# Patient Record
Sex: FEMALE | Race: WHITE
[De-identification: names, ages, dates, MRNs, and addresses within clinical notes are randomized per-mention and may not be internally consistent; named-entity substitution may affect disease eponyms.]

---

## 2018-02-10 ENCOUNTER — HOSPITAL ENCOUNTER (EMERGENCY)
Dept: HOSPITAL 24 - ER | Age: 48
LOS: 1 days | Discharge: HOME | End: 2018-02-11
Payer: COMMERCIAL

## 2018-02-10 VITALS — DIASTOLIC BLOOD PRESSURE: 58 MMHG | SYSTOLIC BLOOD PRESSURE: 108 MMHG

## 2018-02-10 VITALS — BODY MASS INDEX: 54.3 KG/M2

## 2018-02-10 DIAGNOSIS — M25.552: ICD-10-CM

## 2018-02-10 DIAGNOSIS — M48.061: Primary | ICD-10-CM

## 2018-02-10 DIAGNOSIS — M51.36: ICD-10-CM

## 2018-02-10 DIAGNOSIS — M51.06: ICD-10-CM

## 2018-02-10 DIAGNOSIS — E66.01: ICD-10-CM

## 2018-02-10 PROCEDURE — 99282 EMERGENCY DEPT VISIT SF MDM: CPT

## 2018-02-10 PROCEDURE — 73501 X-RAY EXAM HIP UNI 1 VIEW: CPT

## 2018-02-10 PROCEDURE — 99284 EMERGENCY DEPT VISIT MOD MDM: CPT

## 2018-02-10 PROCEDURE — 81001 URINALYSIS AUTO W/SCOPE: CPT

## 2018-02-10 PROCEDURE — 96372 THER/PROPH/DIAG INJ SC/IM: CPT

## 2018-02-10 PROCEDURE — 72131 CT LUMBAR SPINE W/O DYE: CPT

## 2018-02-10 NOTE — DR.GENAD
HPI





- PCP


Primary Care Physician: DR. LYNDSEY MATOS





- Complaint/Symptoms


Chief Complaint Doctors Comments: Patient is complaining of left hip and lower 

back pain for the past 24 hours getting worst tonight with her having problems 

with her balance. States she took a pain pill earlier today but it did not 

relieve the pain completely.  She denies dysuria, hematuri, fever or chills.  

States the pain starts in her lower back on the left side and goes down her 

left leg to her privates.  states the pain is 10 of 10.  States she has been 

taking care of her spouse with a broke hip but she has not been doing any heavy 

lifting.  States she has been loosing weight recently and went from 335 to 317. 

She denies any recent trauma.


Chief Complaint:: LEFT HIP PAIN THAT RADIATES TO CENTER OF BACK AND GOES DOWN; 

NUMBNESS


Self Treatment fo Chief Complaint: NO TX





- Nurses notes reviewed


Nurses Notes Review: Yes





- Source


History Provided: Patient





- Mode of Arrival


Mode of Arrival: Wheelchair





- Timing


Onset of Chief Complaint: 02/10/18


Came on: Gradually





- Duration


Duration: Constant


How long: 3


Duration: Days





- Location


Location: let hip and lower back pain





- Severity


Severity: Moderate





- Modifying Factors


Worsens:: walking and movement


Improves:: nothing





PMH





- PMH


Past Medical History: Yes


Past Medical History: Diabetes, Dyslipidemia, GERD, Hypertension


Past Medical History Comment: RLS


Past Surgical History: Yes


Surgical History: Tonsillectomy


Past Surgical History Comment: RIGHT EAR SURGERY; CATARACT;





- Family History


History of Family Medical Conditions: No





- Social History


Alcohol Use: None


Do you use any recreational Drugs:: No


Lives With: Spouse


Lives Where: Home





- infectious screening


In the last 2 months have you had wt loss of >10#?: NO


Have you had fever, night sweats or hemotysis?: No


Have you traveled outside the country in the last 6 months?: No


Isolation: Standard





ROS





- Review of Systems


Constitutional: No Symptoms Reported.  negative: See HPI, Chills, Diaphoresis, 

Fever, Malaise, Weakness, Irritable, Fatigue, Loss of Appetite, Other


Eyes: No Symptoms Reported.  negative: See HPI, Eye Pain, Blurred Vision, 

Tearing, Discharge, Photophobia, Diplopia, Other


ENTM: No Symptoms Reported, Nose Discharge, Nose Congestion


Respiratoy: No Symptoms Reported.  negative: See HPI, Productive Cough, Non-

Productive Cough, Moist Cough, Dry Cough, Hacking Cough, Barking Cough, Brassy 

Cough, Orthopnea, Short of Breath, Stridor, Wheezing, Hemoptysis, Other


Cardiovascular: No Symptoms Reported


Gastrointestinal/Abdominal: No Symptoms Reported.  negative: See HPI, Abdominal 

Pain, Constipation, Diarrhea, Nausea, Vomiting, Food Intolerance, Other


Genitourinary: No Symptoms Reported.  negative: See HPI, Discharge, Dysuria, 

Frequency, Hematuria, Pain, Bleeding, Other


Neurological: No Symptoms Reported, Paresthesia (left leg), Problems Walking (

left hip and back pain)


Musculoskeletal: No Symptoms Reported, Left, Back, Hip


Integumentary: No Symptoms Reported.  negative: See HPI, Change in Color, 

Change in Hair/Nails, Dryness, Lesions, Lumps, Rash, Itching, Wound, Bruises, 

Juandice, Other


Hematologic/Lymphatic: No Symptoms Reported.  negative: See HPI, Anemia, Blood 

Clots, Easy Bleeding, Easy Bruising, Swollen Glands, Lymphadenopathy, Other


Endocrine: No Symptoms Reported


Psychiatric: No Symptoms Reported.  negative: See HPI, Anxiety, Depression, 

Hallucinations, Excessive crying, Suicidal, Other





PE





- Vital Signs


Vitals: 


 





Temperature                      97.8 F


Pulse Rate                       65


Respiratory Rate                 20


Blood Pressure                   108/58


O2 Sat by Pulse Oximetry         100











- General


Limitations: No Limitations


General Appearance: Alert, In Distress (moderate)





- Head


Head Exam: Normal Inspection, Atraumatic, Normocephalic





- Eyes


Eye exam: Normal Appearance, PERRL, EOMI.  negative: Scleral Icterus, 

Conjunctival Injection, Nystagmus, Miosis, Mydrasis, Periorbital Swelling, 

Periorbital Tenderness, Other





- ENT


ENT Exam: Normal Exam, Normal Oropharynx, Normal  External Ear Exam, Mucous 

Membranes Moist, TM's Normal Bilaterally


External Ear Exam: Normal External Inspection


TM/Canal Exam: Bilateral Normal


Nose Exam: Normal Nose Exam


Mouth Exam: Normal Inspection.  negative: Drooling, Trismus, Lip Swelling, 

Tongue Elevation, Tongue Swelling, Laceration, Other


Throat Exam: Normal Inspection





- Neck


Neck Exam: Normal Inspection, Full ROM, Trachea Midline.  negative: Tenderness, 

Meningismus, Lymphadenopathy, Thyromegaly, Other





- Chest


Chest Inspection: Normal Inspection, Symmetric Chest Wall Rise





- Respiratory


Respiratory Exam: Normal Lung Sounds Bilat


Respiratory Exam: Bilateral Clear to Auscultation





- Cardiovascular


Cardiovascular Exam: Regular Rate, Normal Rhythm, Normal Heart Sounds





- Abdominal Exam


Abdominal Exam: Normal Inspection, Normal Bowel Sounds, Soft


Abdominal Tenderness: negative: RUQ, RLQ, LUQ, LLQ, Epigastrium, Suprapubic, 

Diffuse, Mild, Moderate, Severe, Other





- Extremities


Extremities Exam: Normal Inspection, Full ROM, Tenderness (left hi tender on 

palpation; tender on palpation lower back L2-4), Normal Capillary Refill.  

negative: Edema, Joint Swelling, Calf Tenderness





- Back


Back Exam: Normal Inspection, Full ROM, Tenderness, Paraspinal Tenderness, 

Vertebral Tenderness (L2-L4)





- Neurologic


Neurological Exam: Alert, Oriented X3, CN II-XII Intact, Normal Gait, Reflexes 

Normal





- Psychiatric


Psychiatric Exam: Normal Affect, Normal Mood





- Skin


Skin Exam: Warm, Dry, Intact, Normal Color





ROR





- Labs Reviewed


Laboratory Results Reviewed?: Yes (all x-ray results reviewed and discussed 

with patient and mother)





- XRAY


XRAY Interpreted by: Radiologist (CT lumbar: Multilevel discogenic degenerative 

change and facet arthropathy causing varying degrees of spinal canal and bony 

neural foraminal stenosis)





- Diagnosis


Discharge Problem: 


 Spinal stenosis at L4-L5 level, Bulging lumbar disc, Bulging of intervertebral 

disc between L4 and L5, Degenerative disc disease, lumbar, Left hip pain, 

Morbid obesity








- Discharge Plan


Disposition: 01 HOME, SELF-CARE


Condition: Stable


Prescriptions: 


Ketorolac Tromethamine [Toradol Tab] 10 mg PO Q8H PRN #12 tab


 PRN Reason: Pain


Methylprednisolone Dosepak 4Mg [MEDROL DOSEPAK (4 mg tab x 21)] 1 yesenia PO ONCE #

1 yesenia





- Follow ups/Referrals


Follow ups/Referrals: 


[facility],None [Primary Care Provider] - 3 days


SALENA WEST [STAFF PHYSICIAN] - 3 days





- Instructions


Instructions:  Spinal Stenosis, Degenerative Disk Disease, Back Pain, Adult, 

Easy-to-Read

## 2018-02-11 LAB
BACTERIA URNS QL MICRO: NEGATIVE /HPF
BILIRUB UR QL STRIP.AUTO: (no result)
CLARITY UR: CLEAR
COLOR UR AUTO: YELLOW
GLUCOSE UR QL STRIP.AUTO: NEGATIVE
KETONES UR QL STRIP.AUTO: NEGATIVE
LEUKOCYTE ESTERASE UR QL STRIP.AUTO: NEGATIVE
NITRITE UR QL STRIP.AUTO: NEGATIVE
PH UR STRIP.AUTO: 5 [PH] (ref 5–8)
PROT UR QL STRIP.AUTO: NEGATIVE
RBC # UR STRIP.AUTO: (no result) /UL
RBC #/AREA URNS HPF: (no result) /HPF
SP GR UR STRIP.AUTO: 1.02 (ref 1–1.03)
SQUAMOUS URNS QL MICRO: (no result) /HPF
UROBILINOGEN UR QL STRIP.AUTO: (no result)

## 2018-02-11 NOTE — CT
CT lumbar spine without contrast



Indication: Back pain with left-sided radiculopathy



Comparison: None available



Technique: Multiple axial images of the lumbar spine were obtained from the upper abdomen to the pelv
is without administration of IV contrast.  Sagittal and coronal reformats were performed and reviewed
.



Findings:



Alignment of the lumbar spine is maintained.  No evidence for acute cortical disruption or subluxatio
n can be seen.  The posterior elements appear unremarkable.  The prevertebral soft tissues are normal
 in their appearance.  In addition, the surrounding paraspinous soft tissues are unremarkable.



Mild multilevel spondylosis throughout the lumbar spine with moderate spondylosis at L5-S1. There is 
moderate facet arthropathy bilaterally at L4-5. Multilevel facet arthropathy is noted throughout the 
remaining lumbar spine.. Limited evaluation of disc bulge demonstrates broad-based disc bulge at L3-4
 causing at least mild spinal canal stenosis with mild-to-moderate right-sided bony neural foraminal 
narrowing. Broad-based disc bulge at L4-5 with facet arthropathy causes mild-to-moderate spinal canal
 stenosis with mild right and mild-to-moderate left-sided bony neural foraminal narrowing. Mild broad
-based disc bulge L5-S1 causes mild spinal canal stenosis with moderate right and mild left-sided bon
y neural foraminal stenosis.



IMPRESSION:

 

No evidence for traumatic injury of the lumbar spine.

Multilevel discogenic degenerative change and facet arthropathy causing varying degrees of spinal can
al and bony neural foraminal stenosis as described above. Please note CT examination is less specific
 insensitive for detection of spinal canal and bony neural foraminal stenosis and MRI can further vincent
luation MRI is recommended given patient's symptoms on nonemergent basis.





Reported By:Electronically Signed by CAROL ARNETT MD at 2/11/2018 12:14:51 AM

## 2018-02-11 NOTE — RAD
Two views of the left hip



Indication: Left hip pain that radiates to back



Findings: No acute fracture dislocation within the left hip. Mild degenerative change at the os aceta
buli are noted within the left femoroacetabular joint. Femoral head remains well positioned within th
e acetabular fossa. No femoral neck fracture identified. Pubic symphysis and SI joints are intact.



Impression: 

1.No acute radiographic abnormality within the left hip.



2. Mild degenerative change in and/or os acetabuli noted within the left femoroacetabular joint.



Reported By:Electronically Signed by CAROL ARNETT MD at 2/11/2018 1:08:52 AM

## 2018-02-13 ENCOUNTER — HOSPITAL ENCOUNTER (EMERGENCY)
Dept: HOSPITAL 24 - ER | Age: 48
Discharge: HOME | End: 2018-02-13
Payer: COMMERCIAL

## 2018-02-13 VITALS — DIASTOLIC BLOOD PRESSURE: 71 MMHG | SYSTOLIC BLOOD PRESSURE: 132 MMHG

## 2018-02-13 VITALS — BODY MASS INDEX: 54.3 KG/M2

## 2018-02-13 DIAGNOSIS — M51.36: Primary | ICD-10-CM

## 2018-02-13 LAB
BACTERIA URNS QL MICRO: (no result) /HPF
BILIRUB UR QL STRIP.AUTO: (no result)
CLARITY UR: CLEAR
COLOR UR AUTO: YELLOW
GLUCOSE UR QL STRIP.AUTO: NEGATIVE
KETONES UR QL STRIP.AUTO: NEGATIVE
LEUKOCYTE ESTERASE UR QL STRIP.AUTO: NEGATIVE
NITRITE UR QL STRIP.AUTO: NEGATIVE
PH UR STRIP.AUTO: 7 [PH] (ref 5–8)
PROT UR QL STRIP.AUTO: NEGATIVE
RBC # UR STRIP.AUTO: NEGATIVE /UL
RBC #/AREA URNS HPF: (no result) /HPF
SP GR UR STRIP.AUTO: 1.01 (ref 1–1.03)
SQUAMOUS URNS QL MICRO: (no result) /HPF
UROBILINOGEN UR QL STRIP.AUTO: (no result)

## 2018-02-13 PROCEDURE — 81001 URINALYSIS AUTO W/SCOPE: CPT

## 2018-02-13 PROCEDURE — 96372 THER/PROPH/DIAG INJ SC/IM: CPT

## 2018-02-13 PROCEDURE — 99282 EMERGENCY DEPT VISIT SF MDM: CPT

## 2018-02-13 NOTE — DR.GENAD
HPI





- PCP


Primary Care Physician: ALBER MATOS





- Complaint/Symptoms


Chief Complaint Doctors Comments: Patient was evaluated two days ago with x 

rays of lumbar and hip. Diagnosed with DJD; she is on Norco-10 for pain. Admits 

to seeing her physician today.


Chief Complaint:: PAIN IN LEFT HIP AND BACK; PAIN RADIATES DOWN LEFT LEG


Self Treatment fo Chief Complaint: PT HAS NOT TAKEN ANY OF HER MEDS; PT HAS 

APPT WITH DR. MCGREGOR ON THURSDAY, BUT SHE IS IN A LOT OF PAIN NOW.





- Source


History Provided: Patient





- Mode of Arrival


Mode of Arrival: Ambulatory





- Timing


Onset of Chief Complaint: 02/07/18





PMH





- PMH


Past Medical History: Yes


Past Medical History: Dyslipidemia, GERD, Headaches, Hypertension


Past Medical History Comment: RLS;


Past Surgical History: Yes


Surgical History: Tonsillectomy


Past Surgical History Comment: RIGHT EAR SURGERY





- Family History


History of Family Medical Conditions: No





- Social History


Does patient currently use any type of tobacco product: No


Alcohol Use: None


Do you use any recreational Drugs:: No


Lives With: Spouse





- infectious screening


In the last 2 months have you had wt loss of >10#?: NO


Have you had fever, night sweats or hemotysis?: No


Have you traveled outside the country in the last 6 months?: No


Isolation: Standard





ROS





- Review of Systems


Eyes: No Symptoms Reported


ENTM: No Symptoms Reported


Respiratoy: No Symptoms Reported


Cardiovascular: No Symptoms Reported


Gastrointestinal/Abdominal: No Symptoms Reported


Genitourinary: No Symptoms Reported


Neurological: No Symptoms Reported


Musculoskeletal: No Symptoms Reported


Integumentary: No Symptoms Reported


Hematologic/Lymphatic: No Symptoms Reported


Endocrine: No Symptoms Reported


Psychiatric: No Symptoms Reported


All Other Systems: Reviewed and Negative





PE





- Vital Signs


Vitals: 


 





Temperature                      97.7 F


Pulse Rate                       68


Respiratory Rate                 16


Blood Pressure [Right Arm]       132/71


Blood Pressure                   138/67


O2 Sat by Pulse Oximetry         100











- General


Limitations: No Limitations


General Appearance: Alert, In No Apparent Distress





- Head


Head Exam: Normal Inspection, Atraumatic





- Eyes


Eye exam: Normal Appearance, PERRL, EOMI





- ENT


ENT Exam: Normal Exam


External Ear Exam: Normal External Inspection


TM/Canal Exam: Bilateral Normal


Nose Exam: Normal Nose Exam


Mouth Exam: Normal Inspection


Throat Exam: Normal Inspection





- Neck


Neck Exam: Normal Inspection, Full ROM





- Chest


Chest Inspection: Normal Inspection, Symmetric Chest Wall Rise





- Respiratory


Respiratory Exam: Normal Lung Sounds Bilat, Accessory Muscle Use


Respiratory Exam: Bilateral Clear to Auscultation





- Cardiovascular


Cardiovascular Exam: Regular Rate, Normal Rhythm





- Abdominal Exam


Abdominal Exam: Normal Inspection, Normal Bowel Sounds


Abdominal Tenderness: negative: RUQ, RLQ, LUQ, LLQ, Epigastrium, Suprapubic, 

Diffuse, Mild, Moderate, Severe, Other





- Extremities


Extremities Exam: Normal Inspection, Full ROM





- Back


Back Exam: Normal Inspection, Full ROM





- Neurologic


Neurological Exam: Alert, Oriented X3, CN II-XII Intact





- Psychiatric


Psychiatric Exam: Normal Affect, Normal Mood





- Skin


Skin Exam: Warm, Dry, Intact





- Diagnosis


Discharge Problem: 


 Degenerative disc disease, lumbar








- Discharge Plan


Condition: Stable





- Follow ups/Referrals


Follow ups/Referrals: 


ALBER MATOS [Primary Care Provider] - 3 days





- Instructions

## 2018-02-25 ENCOUNTER — HOSPITAL ENCOUNTER (EMERGENCY)
Dept: HOSPITAL 24 - ER | Age: 48
Discharge: HOME | End: 2018-02-25
Payer: COMMERCIAL

## 2018-02-25 VITALS — BODY MASS INDEX: 54.3 KG/M2

## 2018-02-25 VITALS — SYSTOLIC BLOOD PRESSURE: 120 MMHG | DIASTOLIC BLOOD PRESSURE: 80 MMHG

## 2018-02-25 DIAGNOSIS — M51.06: Primary | ICD-10-CM

## 2018-02-25 DIAGNOSIS — M51.36: ICD-10-CM

## 2018-02-25 PROCEDURE — 96372 THER/PROPH/DIAG INJ SC/IM: CPT

## 2018-02-25 PROCEDURE — 99282 EMERGENCY DEPT VISIT SF MDM: CPT

## 2018-02-25 NOTE — DR.GENAD
HPI





- PCP


Primary Care Physician: LYNDSEY MATOS





- Complaint/Symptoms


Chief Complaint Doctors Comments: Patient presents with complaint of back and 

hip pain.  She has been diagnosed with DJD recommended to follow up with 

primary care physician.


Chief Complaint:: HURTING IN BACK, NECK, BILATERAL LEGS AND HEADACHE.


Self Treatment fo Chief Complaint: TAKEN DICLOFENAC BUT OUT OF HYDROCODONE, 

LAID DOWN





- Source


History Provided: Patient





- Mode of Arrival


Mode of Arrival: Wheelchair





- Timing


Onset of Chief Complaint: 02/25/18





PMH





- PMH


Past Medical History: Yes


Past Medical History: Anxiety, Arthritis, Depression, Migraines, GERD, Headaches

, Hypertension, Hypothyroidism, Sleep Apnea


Past Surgical History: Yes


Surgical History: Tonsillectomy


Past Surgical History Comment: EAR, CATARACT BILATERAL





- Family History


History of Family Medical Conditions: Yes


Family Medical History: Diabetes Mellitus, MI, Coronary Artery Disease, 

Hypertension





- Social History


Does patient currently use any type of tobacco product: No


Have you used tobacco products in the last 12 months: No


Type of Tobacco Use: None


Alcohol Use: None


Do you use any recreational Drugs:: No


Lives With: Spouse


Lives Where: Home





- infectious screening


In the last 2 months have you had wt loss of >10#?: NO


Have you had fever, night sweats or hemotysis?: No


Have you traveled outside the country in the last 6 months?: No


Isolation: Standard





ROS





- Review of Systems


Eyes: No Symptoms Reported


ENTM: No Symptoms Reported


Respiratoy: No Symptoms Reported


Cardiovascular: No Symptoms Reported


Gastrointestinal/Abdominal: No Symptoms Reported


Genitourinary: No Symptoms Reported


Neurological: No Symptoms Reported


Musculoskeletal: No Symptoms Reported


Integumentary: No Symptoms Reported


Hematologic/Lymphatic: No Symptoms Reported


Endocrine: No Symptoms Reported


Psychiatric: No Symptoms Reported


All Other Systems: Reviewed and Negative





PE





- Vital Signs


Vitals: 


 





Temperature                      97.9 F


Pulse Rate                       83


Respiratory Rate                 20


Blood Pressure [Right Arm]       132/71


Blood Pressure                   120/80


O2 Sat by Pulse Oximetry         98











- General


Limitations: No Limitations


General Appearance: Alert, In No Apparent Distress





- Head


Head Exam: Normal Inspection, Atraumatic





- Eyes


Eye exam: Normal Appearance, PERRL, EOMI





- ENT


ENT Exam: Normal Exam


External Ear Exam: Normal External Inspection


TM/Canal Exam: Bilateral Normal


Nose Exam: Normal Nose Exam


Mouth Exam: Normal Inspection


Throat Exam: Normal Inspection





- Neck


Neck Exam: Normal Inspection, Full ROM





- Chest


Chest Inspection: Normal Inspection





- Respiratory


Respiratory Exam: Normal Lung Sounds Bilat


Respiratory Exam: Bilateral Clear to Auscultation





- Cardiovascular


Cardiovascular Exam: Regular Rate, Normal Rhythm





- Abdominal Exam


Abdominal Exam: Normal Inspection


Abdominal Tenderness: negative: RUQ, RLQ, LUQ, LLQ, Epigastrium, Suprapubic, 

Diffuse, Mild, Moderate, Severe, Other





- Extremities


Extremities Exam: Normal Inspection, Full ROM





- Back


Back Exam: Normal Inspection, Other (mid low back tenderness)





- Neurologic


Neurological Exam: Alert, Oriented X3, CN II-XII Intact





- Psychiatric


Psychiatric Exam: Normal Affect





- Skin


Skin Exam: Warm, Dry, Intact





- Diagnosis


Discharge Problem: 


 Bulging of intervertebral disc between L4 and L5, Degenerative disc disease, 

lumbar








- Discharge Plan


Condition: Stable





- Follow ups/Referrals


Follow ups/Referrals: 


ALBER MATOS [Primary Care Provider] - 3 days





- Instructions